# Patient Record
Sex: MALE | Race: WHITE | NOT HISPANIC OR LATINO | Employment: OTHER | URBAN - METROPOLITAN AREA
[De-identification: names, ages, dates, MRNs, and addresses within clinical notes are randomized per-mention and may not be internally consistent; named-entity substitution may affect disease eponyms.]

---

## 2017-11-08 ENCOUNTER — OFFICE VISIT (OUTPATIENT)
Dept: MEDICAL GROUP | Facility: MEDICAL CENTER | Age: 47
End: 2017-11-08
Payer: COMMERCIAL

## 2017-11-08 VITALS
RESPIRATION RATE: 16 BRPM | SYSTOLIC BLOOD PRESSURE: 140 MMHG | WEIGHT: 267 LBS | DIASTOLIC BLOOD PRESSURE: 72 MMHG | TEMPERATURE: 98 F | OXYGEN SATURATION: 96 % | HEIGHT: 74 IN | BODY MASS INDEX: 34.27 KG/M2 | HEART RATE: 67 BPM

## 2017-11-08 DIAGNOSIS — N43.3 HYDROCELE, UNSPECIFIED HYDROCELE TYPE: ICD-10-CM

## 2017-11-08 DIAGNOSIS — Z00.00 ROUTINE GENERAL MEDICAL EXAMINATION AT A HEALTH CARE FACILITY: ICD-10-CM

## 2017-11-08 DIAGNOSIS — E66.9 OBESITY (BMI 30-39.9): ICD-10-CM

## 2017-11-08 PROCEDURE — 99386 PREV VISIT NEW AGE 40-64: CPT | Performed by: NURSE PRACTITIONER

## 2017-11-08 ASSESSMENT — PATIENT HEALTH QUESTIONNAIRE - PHQ9: CLINICAL INTERPRETATION OF PHQ2 SCORE: 0

## 2017-11-09 NOTE — PROGRESS NOTES
"Subjective:      Oskar Bean is a 47 y.o. male who presents with New Patient            HPI Oskar Bean Is here today to establish care and for lab work.    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 He has not had any medical care in a few years but now has insurance. He states his health is generally well and the only chronic problem he has is a large hydrocele of his testicles. It is worse on the right side and there is noticeable swelling in his pants. He states they are not tender and this was worked up when he was with [facility] and was told it was not a hernia and that he could have surgery if he wished but it wasn't very important to do so. He states he still does not give him any discomfort except the fact that there is swelling in the area. He reports his health is generally well but he has not done lab work in a while. Review of his history shows he does not smoke or use drugs but he does drink at least 20 alcoholic beverages per week.        Social History   Substance Use Topics   • Smoking status: Former Smoker     Packs/day: 0.25     Years: 2.00     Types: Cigarettes     Quit date: 11/8/2002   • Smokeless tobacco: Never Used   • Alcohol use Yes      Comment: 20 a week     No current outpatient prescriptions on file.     No current facility-administered medications for this visit.    History reviewed. No pertinent past medical history.   Family History   Problem Relation Age of Onset   • Diabetes Mother    • No Known Problems Father        Review of Systems   All other systems reviewed and are negative.         Objective:     /72   Pulse 67   Temp 36.7 °C (98 °F)   Resp 16   Ht 1.88 m (6' 2\")   Wt 121.1 kg (267 lb)   SpO2 96%   BMI 34.28 kg/m²      Physical Exam   Constitutional: He is oriented to person, place, and time. He appears well-developed and well-nourished. No distress.   HENT:   Head: Normocephalic " and atraumatic.   Right Ear: External ear normal.   Left Ear: External ear normal.   Nose: Nose normal.   Mouth/Throat: Oropharynx is clear and moist.   Eyes: Conjunctivae are normal. Right eye exhibits no discharge. Left eye exhibits no discharge.   Neck: Normal range of motion. Neck supple. No tracheal deviation present. No thyromegaly present.   Cardiovascular: Normal rate, regular rhythm and normal heart sounds.    No murmur heard.  Pulmonary/Chest: Effort normal and breath sounds normal. No respiratory distress. He has no wheezes. He has no rales.   Abdominal: Soft. Bowel sounds are normal. He exhibits no distension and no mass. There is no tenderness. There is no rebound and no guarding. No hernia.   Genitourinary:   Genitourinary Comments: Swelling of the right testicle.   Lymphadenopathy:     He has no cervical adenopathy.   Neurological: He is alert and oriented to person, place, and time. Coordination normal.   Skin: Skin is warm and dry. No rash noted. He is not diaphoretic. No erythema.   Psychiatric: He has a normal mood and affect. His behavior is normal. Judgment and thought content normal.   Nursing note and vitals reviewed.              Assessment/Plan:     1. Routine general medical examination at a health care facility  Patient to do lab work and I will have him follow back if anything is abnormal but otherwise he will follow back in a year.  - COMP METABOLIC PANEL; Future  - LIPID PROFILE; Future  - TSH; Future    2. Hydrocele, unspecified hydrocele type  I discussed with patient getting a ultrasound of the area since he has been a few years as well as a referral to urology but he states he has high co-pays so would prefer not to do this unless his symptoms worsen.    3. Obesity (BMI 30-39.9)    - Patient identified as having weight management issue.  Appropriate orders and counseling given.

## 2017-11-27 ENCOUNTER — OFFICE VISIT (OUTPATIENT)
Dept: MEDICAL GROUP | Facility: MEDICAL CENTER | Age: 47
End: 2017-11-27
Payer: COMMERCIAL

## 2017-11-27 VITALS
OXYGEN SATURATION: 97 % | SYSTOLIC BLOOD PRESSURE: 132 MMHG | HEART RATE: 60 BPM | WEIGHT: 273 LBS | DIASTOLIC BLOOD PRESSURE: 70 MMHG | HEIGHT: 74 IN | RESPIRATION RATE: 16 BRPM | TEMPERATURE: 97.2 F | BODY MASS INDEX: 35.04 KG/M2

## 2017-11-27 DIAGNOSIS — N43.3 HYDROCELE, UNSPECIFIED HYDROCELE TYPE: ICD-10-CM

## 2017-11-27 DIAGNOSIS — E78.5 DYSLIPIDEMIA: ICD-10-CM

## 2017-11-27 DIAGNOSIS — R73.01 IMPAIRED FASTING GLUCOSE: ICD-10-CM

## 2017-11-27 DIAGNOSIS — N50.89 ENLARGED TESTICLE: ICD-10-CM

## 2017-11-27 LAB
HBA1C MFR BLD: 5 % (ref ?–5.8)
INT CON NEG: NORMAL
INT CON POS: NORMAL

## 2017-11-27 PROCEDURE — 83036 HEMOGLOBIN GLYCOSYLATED A1C: CPT | Performed by: NURSE PRACTITIONER

## 2017-11-27 PROCEDURE — 99213 OFFICE O/P EST LOW 20 MIN: CPT | Performed by: NURSE PRACTITIONER

## 2017-11-28 NOTE — PROGRESS NOTES
"Subjective:      Oskar Bean is a 47 y.o. male who presents with Results (lab results ) and Referral Needed (urology )            HPI Oskar Bean Is here today to discuss recent lab results as well as requesting referral to urology.      1. Dyslipidemia  Patient does not remember his last lipid panel results but his current results come back with an LDL of 141, total cholesterol 230 and triglycerides of 325. His LDL was slightly low at 39.    2. Hydrocele, unspecified hydrocele type  Patient reported on his initial visit that he was [place]rology [place] of state and had decided to wait on surgical intervention but now would like to talk to a local urologist about this.    3. Enlarged testicle  Patient has a very large left testicle with some swelling of the right testicle as well and this has been a issue for a while. He declined surgery when he was with [facility].    4. Impaired fasting glucose  Most recent fasting blood sugar comes back mildly elevated at 123. Patient denies history of diabetes and has no diabetic symptoms.  No current outpatient prescriptions on file.     No current facility-administered medications for this visit.      Social History   Substance Use Topics   • Smoking status: Former Smoker     Packs/day: 0.25     Years: 2.00     Types: Cigarettes     Quit date: 11/8/2002   • Smokeless tobacco: Never Used   • Alcohol use Yes      Comment: 20 a week     Family History   Problem Relation Age of Onset   • Diabetes Mother    • No Known Problems Father    History reviewed. No pertinent past medical history.    Review of Systems   All other systems reviewed and are negative.         Objective:     /70   Pulse 60   Temp 36.2 °C (97.2 °F)   Resp 16   Ht 1.88 m (6' 2\")   Wt 123.8 kg (273 lb)   SpO2 97%   BMI 35.05 kg/m²      Physical Exam   Constitutional: He is oriented to person, place, and time. He appears well-developed and well-nourished. No distress.   HENT:   Head: " Normocephalic and atraumatic.   Right Ear: External ear normal.   Left Ear: External ear normal.   Nose: Nose normal.   Mouth/Throat: Oropharynx is clear and moist.   Eyes: Conjunctivae are normal. Right eye exhibits no discharge. Left eye exhibits no discharge.   Neck: Normal range of motion. Neck supple. No tracheal deviation present. No thyromegaly present.   Cardiovascular: Normal rate, regular rhythm and normal heart sounds.    No murmur heard.  Pulmonary/Chest: Effort normal and breath sounds normal. No respiratory distress. He has no wheezes. He has no rales.   Genitourinary:   Genitourinary Comments: Enlarged testicles.   Lymphadenopathy:     He has no cervical adenopathy.   Neurological: He is alert and oriented to person, place, and time. Coordination normal.   Skin: Skin is warm and dry. No rash noted. He is not diaphoretic. No erythema.   Psychiatric: He has a normal mood and affect. His behavior is normal. Judgment and thought content normal.   Nursing note and vitals reviewed.              Assessment/Plan:     1. Dyslipidemia  I reviewed options with patient and since he has no history of heart disease or diabetes, he would like to try changing his diet and exercising more over the next 6 months and then will do repeat lab work.  - LIPID PROFILE; Future  - COMP METABOLIC PANEL; Future    2. Hydrocele, unspecified hydrocele type    - REFERRAL TO UROLOGY    3. Enlarged testicle  Patient would like to just discuss possible intervention with local urology.  - REFERRAL TO UROLOGY    4. Impaired fasting glucose  Although blood sugar was elevated at 123, his hemoglobin A1c comes back normal at 5.0.  - POCT  A1C

## 2018-01-03 DIAGNOSIS — N43.3 HYDROCELE, UNSPECIFIED HYDROCELE TYPE: ICD-10-CM

## 2018-01-08 ENCOUNTER — HOSPITAL ENCOUNTER (OUTPATIENT)
Dept: RADIOLOGY | Facility: MEDICAL CENTER | Age: 48
End: 2018-01-08
Attending: NURSE PRACTITIONER
Payer: COMMERCIAL

## 2018-01-08 DIAGNOSIS — N43.3 HYDROCELE, UNSPECIFIED HYDROCELE TYPE: ICD-10-CM

## 2018-01-08 PROCEDURE — 76870 US EXAM SCROTUM: CPT

## 2018-01-17 DIAGNOSIS — Z01.812 PRE-OPERATIVE LABORATORY EXAMINATION: ICD-10-CM

## 2018-01-17 LAB
ALBUMIN SERPL BCP-MCNC: 4.1 G/DL (ref 3.2–4.9)
ALBUMIN/GLOB SERPL: 1.5 G/DL
ALP SERPL-CCNC: 57 U/L (ref 30–99)
ALT SERPL-CCNC: 22 U/L (ref 2–50)
ANION GAP SERPL CALC-SCNC: 8 MMOL/L (ref 0–11.9)
APPEARANCE UR: CLEAR
AST SERPL-CCNC: 18 U/L (ref 12–45)
BILIRUB SERPL-MCNC: 0.8 MG/DL (ref 0.1–1.5)
BILIRUB UR QL STRIP.AUTO: NEGATIVE
BUN SERPL-MCNC: 21 MG/DL (ref 8–22)
CALCIUM SERPL-MCNC: 9.2 MG/DL (ref 8.5–10.5)
CHLORIDE SERPL-SCNC: 102 MMOL/L (ref 96–112)
CO2 SERPL-SCNC: 28 MMOL/L (ref 20–33)
COLOR UR: YELLOW
CREAT SERPL-MCNC: 1.02 MG/DL (ref 0.5–1.4)
GLOBULIN SER CALC-MCNC: 2.7 G/DL (ref 1.9–3.5)
GLUCOSE SERPL-MCNC: 104 MG/DL (ref 65–99)
GLUCOSE UR STRIP.AUTO-MCNC: NEGATIVE MG/DL
KETONES UR STRIP.AUTO-MCNC: NEGATIVE MG/DL
LEUKOCYTE ESTERASE UR QL STRIP.AUTO: NEGATIVE
MICRO URNS: NORMAL
NITRITE UR QL STRIP.AUTO: NEGATIVE
PH UR STRIP.AUTO: 5.5 [PH]
POTASSIUM SERPL-SCNC: 3.9 MMOL/L (ref 3.6–5.5)
PROT SERPL-MCNC: 6.8 G/DL (ref 6–8.2)
PROT UR QL STRIP: NEGATIVE MG/DL
RBC UR QL AUTO: NEGATIVE
SODIUM SERPL-SCNC: 138 MMOL/L (ref 135–145)
SP GR UR STRIP.AUTO: 1.02
UROBILINOGEN UR STRIP.AUTO-MCNC: 0.2 MG/DL

## 2018-01-17 PROCEDURE — 36415 COLL VENOUS BLD VENIPUNCTURE: CPT

## 2018-01-17 PROCEDURE — 80053 COMPREHEN METABOLIC PANEL: CPT

## 2018-01-17 PROCEDURE — 81003 URINALYSIS AUTO W/O SCOPE: CPT

## 2018-01-17 PROCEDURE — 87086 URINE CULTURE/COLONY COUNT: CPT

## 2018-01-19 LAB
BACTERIA UR CULT: NORMAL
SIGNIFICANT IND 70042: NORMAL
SITE SITE: NORMAL
SOURCE SOURCE: NORMAL

## 2018-01-24 ENCOUNTER — HOSPITAL ENCOUNTER (OUTPATIENT)
Facility: MEDICAL CENTER | Age: 48
End: 2018-01-24
Attending: UROLOGY | Admitting: UROLOGY
Payer: COMMERCIAL

## 2018-01-24 VITALS
TEMPERATURE: 97.7 F | SYSTOLIC BLOOD PRESSURE: 148 MMHG | DIASTOLIC BLOOD PRESSURE: 70 MMHG | WEIGHT: 261.25 LBS | BODY MASS INDEX: 32.48 KG/M2 | RESPIRATION RATE: 20 BRPM | HEIGHT: 75 IN | OXYGEN SATURATION: 96 %

## 2018-01-24 DIAGNOSIS — N43.2 OTHER HYDROCELE: Primary | ICD-10-CM

## 2018-01-24 LAB — PATHOLOGY CONSULT NOTE: NORMAL

## 2018-01-24 PROCEDURE — 160048 HCHG OR STATISTICAL LEVEL 1-5: Performed by: UROLOGY

## 2018-01-24 PROCEDURE — 160047 HCHG PACU  - EA ADDL 30 MINS PHASE II: Performed by: UROLOGY

## 2018-01-24 PROCEDURE — 160025 RECOVERY II MINUTES (STATS): Performed by: UROLOGY

## 2018-01-24 PROCEDURE — 700111 HCHG RX REV CODE 636 W/ 250 OVERRIDE (IP)

## 2018-01-24 PROCEDURE — A6403 STERILE GAUZE>16 <= 48 SQ IN: HCPCS | Performed by: UROLOGY

## 2018-01-24 PROCEDURE — 500383 HCHG DRAIN, PENROSE 3/8X12: Performed by: UROLOGY

## 2018-01-24 PROCEDURE — 500700 HCHG HEMOCLIP, SMALL (RED): Performed by: UROLOGY

## 2018-01-24 PROCEDURE — 160035 HCHG PACU - 1ST 60 MINS PHASE I: Performed by: UROLOGY

## 2018-01-24 PROCEDURE — 160028 HCHG SURGERY MINUTES - 1ST 30 MINS LEVEL 3: Performed by: UROLOGY

## 2018-01-24 PROCEDURE — 160002 HCHG RECOVERY MINUTES (STAT): Performed by: UROLOGY

## 2018-01-24 PROCEDURE — 500445 HCHG HEMOSTAT, SURGICEL 4X8: Performed by: UROLOGY

## 2018-01-24 PROCEDURE — 160039 HCHG SURGERY MINUTES - EA ADDL 1 MIN LEVEL 3: Performed by: UROLOGY

## 2018-01-24 PROCEDURE — 700105 HCHG RX REV CODE 258: Performed by: UROLOGY

## 2018-01-24 PROCEDURE — 500002 HCHG ADHESIVE, DERMABOND: Performed by: UROLOGY

## 2018-01-24 PROCEDURE — 88302 TISSUE EXAM BY PATHOLOGIST: CPT

## 2018-01-24 PROCEDURE — 160046 HCHG PACU - 1ST 60 MINS PHASE II: Performed by: UROLOGY

## 2018-01-24 PROCEDURE — 160009 HCHG ANES TIME/MIN: Performed by: UROLOGY

## 2018-01-24 PROCEDURE — 501838 HCHG SUTURE GENERAL: Performed by: UROLOGY

## 2018-01-24 PROCEDURE — 700101 HCHG RX REV CODE 250

## 2018-01-24 RX ORDER — KETOROLAC TROMETHAMINE 30 MG/ML
INJECTION, SOLUTION INTRAMUSCULAR; INTRAVENOUS
Status: COMPLETED
Start: 2018-01-24 | End: 2018-01-24

## 2018-01-24 RX ORDER — SODIUM CHLORIDE, SODIUM LACTATE, POTASSIUM CHLORIDE, CALCIUM CHLORIDE 600; 310; 30; 20 MG/100ML; MG/100ML; MG/100ML; MG/100ML
INJECTION, SOLUTION INTRAVENOUS
Status: DISCONTINUED | OUTPATIENT
Start: 2018-01-24 | End: 2018-01-24 | Stop reason: HOSPADM

## 2018-01-24 RX ORDER — POLYETHYLENE GLYCOL 3350 17 G/17G
17 POWDER, FOR SOLUTION ORAL DAILY
Qty: 30 EACH | Refills: 0 | Status: SHIPPED | OUTPATIENT
Start: 2018-01-24 | End: 2020-11-16

## 2018-01-24 RX ORDER — DOCUSATE SODIUM 100 MG/1
100 CAPSULE, LIQUID FILLED ORAL 2 TIMES DAILY
Qty: 60 CAP | Refills: 0 | Status: SHIPPED | OUTPATIENT
Start: 2018-01-24 | End: 2018-12-03

## 2018-01-24 RX ORDER — LABETALOL HYDROCHLORIDE 5 MG/ML
INJECTION, SOLUTION INTRAVENOUS
Status: COMPLETED
Start: 2018-01-24 | End: 2018-01-24

## 2018-01-24 RX ORDER — BUPIVACAINE HYDROCHLORIDE 2.5 MG/ML
INJECTION, SOLUTION EPIDURAL; INFILTRATION; INTRACAUDAL
Status: DISCONTINUED | OUTPATIENT
Start: 2018-01-24 | End: 2018-01-24 | Stop reason: HOSPADM

## 2018-01-24 RX ORDER — OXYCODONE HYDROCHLORIDE 5 MG/1
5-10 TABLET ORAL EVERY 4 HOURS PRN
Qty: 15 TAB | Refills: 0 | Status: SHIPPED | OUTPATIENT
Start: 2018-01-24 | End: 2018-01-29

## 2018-01-24 RX ADMIN — LABETALOL HYDROCHLORIDE 10 MG: 5 INJECTION, SOLUTION INTRAVENOUS at 16:44

## 2018-01-24 RX ADMIN — SODIUM CHLORIDE, POTASSIUM CHLORIDE, SODIUM LACTATE AND CALCIUM CHLORIDE: 600; 310; 30; 20 INJECTION, SOLUTION INTRAVENOUS at 13:30

## 2018-01-24 RX ADMIN — KETOROLAC TROMETHAMINE 30 MG: 30 INJECTION, SOLUTION INTRAMUSCULAR at 17:43

## 2018-01-24 ASSESSMENT — ENCOUNTER SYMPTOMS
RESPIRATORY NEGATIVE: 1
EYES NEGATIVE: 1
CONSTITUTIONAL NEGATIVE: 1
MUSCULOSKELETAL NEGATIVE: 1
NEUROLOGICAL NEGATIVE: 1
CARDIOVASCULAR NEGATIVE: 1
PSYCHIATRIC NEGATIVE: 1
GASTROINTESTINAL NEGATIVE: 1

## 2018-01-24 ASSESSMENT — PAIN SCALES - GENERAL
PAINLEVEL_OUTOF10: 0

## 2018-01-24 NOTE — OR NURSING
1245: Brought patient back to pre-op and April, RN assumed care.  1305: Rcvd report from April, ANTONIA and assumed care. Pt sitting on side of the bed, very nervous, tried to assure pt that all was going well.  1344: Patient allergies and NPO status verified, home medication reconciliation completed and belongings secured. Patient verbalizes understanding of pain scale, expected course of stay and plan of care. Surgical site verified with patient. IV access established. Sequentials placed on legs.

## 2018-01-24 NOTE — H&P
[facility] Consult/H&P Note    Primary Service: Urology  Attending: Kaz Dey M.D.  Patient's Name/MRN: Oskar Bean, [id number]    Admit Date:1/24/2018  Today's Date: 1/24/2018   Length of stay:  LOS: 0 days   Room #: SMPREPOOL/NONE      Reason for consult/chief complaint: bilaterl hydroceles.   ID/HPI: Oskar Bean is a 47 y.o. male patient with 6 year history of enalrgin L hydrocele with bilaterl hydroceles.  They are both bothersome and he has massive hydrocele on L in addition to a moderate one on the right.  He strongly desires bilaterl hydrocelectomy.   He ha trouble sitting, has frequent pain, and difficulty with sex because of it.  He has no other new medical problems since we last chatted.      Past Medical History:   History reviewed. No pertinent past medical history.     Past Surgical History:   Past Surgical History:   Procedure Laterality Date   • ANKLE ORIF Left       Family History:   Family History   Problem Relation Age of Onset   • Diabetes Mother    • No Known Problems Father        Social History:   Social History   Substance Use Topics   • Smoking status: Former Smoker     Packs/day: 0.25     Years: 2.00     Types: Cigarettes     Quit date: 11/8/2002   • Smokeless tobacco: Never Used   • Alcohol use 8.4 oz/week     14 Standard drinks or equivalent per week      Social History     Social History Narrative   • No narrative on file        Allergies: he Patient has no known allergies.    Medications:   No prescriptions prior to admission.         Review of Systems  Review of Systems   Constitutional: Negative.    HENT: Negative.    Eyes: Negative.    Respiratory: Negative.    Cardiovascular: Negative.    Gastrointestinal: Negative.    Genitourinary:        Massive scortal swelling.   Musculoskeletal: Negative.    Skin: Negative.    Neurological: Negative.    Endo/Heme/Allergies: Negative.    Psychiatric/Behavioral: Negative.    All other systems reviewed and are negative.    "    Physical Exam  VITAL SIGNS: BP (!) 189/95   Temp 36.6 °C (97.9 °F)   Resp 16   Ht 1.905 m (6' 3\")   Wt 118.5 kg (261 lb 3.9 oz)   SpO2 94%   BMI 32.65 kg/m²   GENERAL:  alert, in no acute distress  EYES: EOMI and normal accomodation  BACK: No CVA tenderness.   CHEST AND LUNGS: CTAB without W/R/R.   CARDIOVASCULAR: RRR without M/R/G's.  ABDOMEN: Abdomen soft, nontender. No masses, organomegaly.  : Massive L hydrocele with inability to palpate testis. This goes back all the way towards external ring.  Moderate R sided hydrocele, soft, testicule is palpable.   EXTREMITIES: Warm and well perfused without c/c/e  NEURO: No focal deficits  SKIN: Skin color, texture, turgor normal. No rashes, lesions, nor jaundice.      Labs:  CBC:   No results found for: WBC, HEMOGLOBIN, HEMATOCRIT    Glucose:  Lab Results   Component Value Date/Time    GLUCOSE 104 (H) 01/17/2018 10:40 AM    Electrolytes:  Lab Results   Component Value Date/Time    SODIUM 138 01/17/2018 10:40 AM    POTASSIUM 3.9 01/17/2018 10:40 AM    CHLORIDE 102 01/17/2018 10:40 AM    CO2 28 01/17/2018 10:40 AM    GLUCOSE 104 (H) 01/17/2018 10:40 AM    BUN 21 01/17/2018 10:40 AM    CREATININE 1.02 01/17/2018 10:40 AM    CALCIUM 9.2 01/17/2018 10:40 AM         Urinalysis:   Lab Results   Component Value Date/Time    COLORURINE Yellow 01/17/2018 10:40 AM    CLARITY Clear 01/17/2018 10:40 AM    SPECGRAVITY 1.023 01/17/2018 10:40 AM    PHURINE 5.5 01/17/2018 10:40 AM    GLUCOSEUR Negative 01/17/2018 10:40 AM    KETONES Negative 01/17/2018 10:40 AM    NITRITE Negative 01/17/2018 10:40 AM    OCCULTBLOOD Negative 01/17/2018 10:40 AM       Imaging:  US 2017 No testicular masses identified.  Single calcification within the right testis nonspecific.  2.7 cm right epididymal cysts.  Massive left hydrocele. Moderate/large Right hydrocele.     Assessment/Recommendation   47 y.o.M with bilatearl hydroceles L >R who desires bilateral hydrocelectomy.  After a full discussion " of the alternatives risks and benefits of the procedure, the patient consented to proceeding with the planned procedure.  Risks discussed, but not limited to, included infection, sepsis, bleeding, hematoma requiring drainage, recurrent hydrocele, skin incision opening, need for secondary procedures, testicular atrophy, testicular or scrotal pain on this side, post op swelling, and the cardiovascular and pulmonary complications of anesthesia/surgery including DVT, Pe, and death.  I did  that we could wait on R hydrocele as it is not tense or as bothersome, however, he worries that like the L it will enlarge and wishes it done at the same time.     · Consent for bilateral hydrocelectomy done  · 2g ancef  · DC home after procedure.        Kaz Dey MD  [address] [facility] [id number]  [place] [id number] [phone]

## 2018-01-24 NOTE — OP REPORT
"[facility] Operative Report    Pre-operative Diagnosis: 1. Bilateral left greater than right Hydrocele, Symptomatic   Post-operative Diagnosis: 2. Same    Procedure: 1. Left hyrdocelectomy  2. Right hydrocelectomy    Attending: Kaz Dey MD   Assistant: None   Anesthesia: LMA, General, Mary   Estimated Blood Loss: minimal   IV fluids <1L crystalloid   Specimens: 1. L Hydrocele Sac  2. R Hydrocele Sac   Drains: 1. 3/8\" penrose via Left hemiscrotum   Complications: None   Wound class I. Clean   Condition: Stable, procedure well tolerated    Disposition:  PACU, home after recovery.   Findings: 1. RTC 4 weeks for f/u appointment  2. f/u 2-3 days for drain removal     Indications for Procedure:  This is a 47 y.o. male with history of bilaterl hydrocele for sometime which has grown in size and is adequately bothersome on regular basis and desires therapy for this.  Alternatives including continued observation, IR drainage were discussed.  Risks discussed, but not limited to, included infection, sepsis, bleeding, hematoma requiring drainage, recurrent hydrocele, skin incision opening, need for secondary procedures, testicular atrophy, testicular or scrotal pain on this side, post op swelling, and the cardiovascular and pulmonary complications of anesthesia/surgery including DVT, Pe, and death. After a full discussion of the alternatives risks and benefits of the procedure, the patient consented to proceeding with the planned procedure.     Procedure in Detail:  The patient was positioned in supine position and perineum and genitals were shaved, prepped and draped in the standard sterile fashion.  We made a 5 cm incision along the median raphae of the scrotum through the dartos. While elevating the large left hydrocele through this incision the fascial layers were taken down layer by layer to the tunica vaginalis.  We did deliver the massive hydrocele and testicle within outside of the scrotum.  Separate " layers of tunica were taken down individually and then the tunical sac was opened and the hydrocele fluid was drained and testicle was delivered. This did drain approximately 1500 mL of fluid. The excess tunica vaginalis was excised and sent for specimen. We used electrocautery to provide initial hemostasis along the edges of excised tunica vaginalis.     Next, we inverted the incised edges of tunica vaginalis and wrapped them around testicle posteriorly and partially approximated them using a running 3-0 vicryl suture to provide hemostasis and prevent recurrent formation of hydrocele. I did additionally run a 3-0 Vicryl whipstitch along the excess edge of the tunica vaginalis was not inverted on that side.  I then took the time everting the dartos fascia and into the vaginalis cauterizing any bleeders I could find. I did pack this left hemiscrotum with 2 Ray-Srinivas's and proceeded to the right side.    Similarly the right hydrocele and testicle was delivered through the dartos and tunica vaginalis.  This did drain approximately 50 mL of fluid and any excess tunica vaginalis was excised avoiding any of the important testicular structures. A 3-0 Vicryl suture was then ran around as a whip stitch along the cut orders of the tunica vaginalis for hemostatic purposes.    We then inverted the dartos layer through the incision and assured meticulous hemostasis using electrocautery.  20cc of 0.25% marcaine were injected generaously into cord as a spermatic cord block in addition bilatearlly and  some along incsion edges. The wound was irrigated with copious irrigation at this point. Dartos was closed using a 2-0 running absorbable suture.  The skin was closed with a running 4-0 chromic gut suture in addition to a three interrupted suture along the closed incision.  This was covered with skin glue, fluffs, and scrotal support.      The patient was extubated easily, tolerated the procedure well, and was taken to the PACU in  stable condition. The attending physician, Kaz Dey M.D. was scrubbed and participated in the entirety of the case.          Kaz Dey MD  [address] [place] [address] [address]  [place] [address] [phone]

## 2018-01-25 NOTE — OR NURSING
1614 Pt to PACU from OR via gurney, respirations spontaneous and non-labored via 1L NC. Groin surgical sites clean, dry and intact, pt awake with no complaints of pain or nausea.  1625 No changes, VSS.   1640 Pt hypertensive Dr. Varela updated on pt status orders received for one dose of 20mg labetolol to be given then ok to D/C home.     1644 10mg administered, pt immediately bradycardic. 2nd 10mg held.  1647 Pt SR in low 60s.  1655 Pt still denies pain and nausea, VSS.  1705 Pt reports back discomfort declines analgesia.    1710 Pt meets criteria for stage II status. Pt stage II status with same RN in PACU I.   1725 Pt dressed and transferred to recliner.   1733 Pt wife at bedside, pt reports pain in low back increasing would like analgesia.    1739 Dr. Varela and Dr. Dey updated on pt status orders received and implemented.   1743 IV toradol given per MD order.   1745 Provided a copy and educated pt and family on discharge instructions.  1803 Pt ambulated to void reports pain has subsided.   Pt unable to void.  1809 Pt up to restroom again.    1815 Pt voids successfully.   1818 Pt D/C home via wheelchair.

## 2018-01-25 NOTE — DISCHARGE INSTRUCTIONS
ACTIVITY: Rest and take it easy for the first 24 hours.  A responsible adult is recommended to remain with you during that time.  It is normal to feel sleepy.  We encourage you to not do anything that requires balance, judgment or coordination.    MILD FLU-LIKE SYMPTOMS ARE NORMAL. YOU MAY EXPERIENCE GENERALIZED MUSCLE ACHES, THROAT IRRITATION, HEADACHE AND/OR SOME NAUSEA.    FOR 24 HOURS DO NOT:  Drive, operate machinery or run household appliances.  Drink beer or alcoholic beverages.   Make important decisions or sign legal documents.    DIET: To avoid nausea, slowly advance diet as tolerated, avoiding spicy or greasy foods for the first day.  Add more substantial food to your diet according to your physician's instructions.  INCREASE FLUIDS AND FIBER TO AVOID CONSTIPATION.    SURGICAL DRESSING/BATHING: Wear jockstrap at all times for the next two weeks. Change fluff as needed. Ok to shower tomorrow but no submerging in water (no baths, no hot tubs, no swimming pools)     FOLLOW-UP APPOINTMENT:  A follow-up appointment should be arranged with your doctor Friday for drain removal; call to schedule.    You should CALL YOUR PHYSICIAN if you develop:  Fever greater than 101 degrees F.  Pain not relieved by medication, or persistent nausea or vomiting.  Excessive bleeding (blood soaking through dressing) or unexpected drainage from the wound.  Extreme redness or swelling around the incision site, drainage of pus or foul smelling drainage.  Inability to urinate or empty your bladder within 8 hours - return to ER.  Problems with breathing or chest pain - call 911.    You should call 911 if you develop problems with breathing or chest pain.  If you are unable to contact your doctor or surgical center, you should go to the nearest emergency room or urgent care center. Dr. Dey' telephone #: [phone].     If any questions arise, call your doctor.  If your doctor is not available, please feel free to call the  Surgical Center at [phone].  The Center is open Monday through Friday from 7AM to 7PM.  You can also call the HEALTH HOTLINE open 24 hours/day, 7 days/week and speak to a nurse at [phone], or toll free at [phone].    A registered nurse may call you a few days after your surgery to see how you are doing after your procedure.    MEDICATIONS: Resume taking daily medication.  Take prescribed pain medication with food.  If no medication is prescribed, you may take non-aspirin pain medication if needed.  PAIN MEDICATION CAN BE VERY CONSTIPATING.  Take a stool softener or laxative such as senokot, pericolace, or milk of magnesia if needed.    Prescription given for Roxicodone.  Last pain medication given at none given in recovery.    If your physician has prescribed pain medication that includes Acetaminophen (Tylenol), do not take additional Acetaminophen (Tylenol) while taking the prescribed medication.    Depression / Suicide Risk    As you are discharged from this [facility] facility, it is important to learn how to keep safe from harming yourself.    Recognize the warning signs:  · Abrupt changes in personality, positive or negative- including increase in energy   · Giving away possessions  · Change in eating patterns- significant weight changes-  positive or negative  · Change in sleeping patterns- unable to sleep or sleeping all the time   · Unwillingness or inability to communicate  · Depression  · Unusual sadness, discouragement and loneliness  · Talk of wanting to die  · Neglect of personal appearance   · Rebelliousness- reckless behavior  · Withdrawal from people/activities they love  · Confusion- inability to concentrate     If you or a loved one observes any of these behaviors or has concerns about self-harm, here's what you can do:  · Talk about it- your feelings and reasons for harming yourself  · Remove any means that you might use to hurt yourself (examples: pills, rope, extension  cords, firearm)  · Get professional help from the community (Mental Health, Substance Abuse, psychological counseling)  · Do not be alone:Call your Safe Contact- someone whom you trust who will be there for you.  · Call your local CRISIS HOTLINE 861-4129 or 807-617-1798  · Call your local Children's Mobile Crisis Response Team [place] (429) 172-3854 or www.[employer]  · Call the toll free National Suicide Prevention Hotlines   · National Suicide Prevention Lifeline 633-762-OSCS (0678)  · National Hope Line Network 800-SUICIDE (634-6667)

## 2018-01-31 DIAGNOSIS — M25.571 CHRONIC PAIN OF RIGHT ANKLE: ICD-10-CM

## 2018-01-31 DIAGNOSIS — G89.29 CHRONIC PAIN OF RIGHT ANKLE: ICD-10-CM

## 2018-11-14 DIAGNOSIS — Z00.00 ROUTINE GENERAL MEDICAL EXAMINATION AT A HEALTH CARE FACILITY: ICD-10-CM

## 2018-11-27 ENCOUNTER — HOSPITAL ENCOUNTER (OUTPATIENT)
Dept: LAB | Facility: MEDICAL CENTER | Age: 48
End: 2018-11-27
Attending: NURSE PRACTITIONER
Payer: COMMERCIAL

## 2018-11-27 DIAGNOSIS — Z00.00 ROUTINE GENERAL MEDICAL EXAMINATION AT A HEALTH CARE FACILITY: ICD-10-CM

## 2018-11-27 PROCEDURE — 80061 LIPID PANEL: CPT

## 2018-11-27 PROCEDURE — 84443 ASSAY THYROID STIM HORMONE: CPT

## 2018-11-27 PROCEDURE — 36415 COLL VENOUS BLD VENIPUNCTURE: CPT

## 2018-11-27 PROCEDURE — 80053 COMPREHEN METABOLIC PANEL: CPT

## 2018-11-28 LAB
ALBUMIN SERPL BCP-MCNC: 4.4 G/DL (ref 3.2–4.9)
ALBUMIN/GLOB SERPL: 1.6 G/DL
ALP SERPL-CCNC: 58 U/L (ref 30–99)
ALT SERPL-CCNC: 19 U/L (ref 2–50)
ANION GAP SERPL CALC-SCNC: 7 MMOL/L (ref 0–11.9)
AST SERPL-CCNC: 16 U/L (ref 12–45)
BILIRUB SERPL-MCNC: 0.7 MG/DL (ref 0.1–1.5)
BUN SERPL-MCNC: 23 MG/DL (ref 8–22)
CALCIUM SERPL-MCNC: 9.2 MG/DL (ref 8.5–10.5)
CHLORIDE SERPL-SCNC: 103 MMOL/L (ref 96–112)
CHOLEST SERPL-MCNC: 230 MG/DL (ref 100–199)
CO2 SERPL-SCNC: 28 MMOL/L (ref 20–33)
CREAT SERPL-MCNC: 1.09 MG/DL (ref 0.5–1.4)
FASTING STATUS PATIENT QL REPORTED: NORMAL
GLOBULIN SER CALC-MCNC: 2.7 G/DL (ref 1.9–3.5)
GLUCOSE SERPL-MCNC: 119 MG/DL (ref 65–99)
HDLC SERPL-MCNC: 42 MG/DL
LDLC SERPL CALC-MCNC: 115 MG/DL
POTASSIUM SERPL-SCNC: 4.5 MMOL/L (ref 3.6–5.5)
PROT SERPL-MCNC: 7.1 G/DL (ref 6–8.2)
SODIUM SERPL-SCNC: 138 MMOL/L (ref 135–145)
TRIGL SERPL-MCNC: 364 MG/DL (ref 0–149)
TSH SERPL DL<=0.005 MIU/L-ACNC: 3.27 UIU/ML (ref 0.38–5.33)

## 2018-12-03 ENCOUNTER — OFFICE VISIT (OUTPATIENT)
Dept: MEDICAL GROUP | Facility: MEDICAL CENTER | Age: 48
End: 2018-12-03
Payer: COMMERCIAL

## 2018-12-03 VITALS
SYSTOLIC BLOOD PRESSURE: 138 MMHG | WEIGHT: 251 LBS | HEIGHT: 75 IN | OXYGEN SATURATION: 98 % | HEART RATE: 74 BPM | RESPIRATION RATE: 16 BRPM | BODY MASS INDEX: 31.21 KG/M2 | DIASTOLIC BLOOD PRESSURE: 76 MMHG

## 2018-12-03 DIAGNOSIS — Z23 NEED FOR TDAP VACCINATION: ICD-10-CM

## 2018-12-03 DIAGNOSIS — R73.01 IMPAIRED FASTING GLUCOSE: ICD-10-CM

## 2018-12-03 DIAGNOSIS — E78.5 DYSLIPIDEMIA: ICD-10-CM

## 2018-12-03 DIAGNOSIS — Z00.00 ROUTINE GENERAL MEDICAL EXAMINATION AT A HEALTH CARE FACILITY: ICD-10-CM

## 2018-12-03 DIAGNOSIS — Z23 INFLUENZA VACCINE NEEDED: ICD-10-CM

## 2018-12-03 PROBLEM — N43.3 HYDROCELE: Status: RESOLVED | Noted: 2017-11-08 | Resolved: 2018-12-03

## 2018-12-03 LAB
HBA1C MFR BLD: 5.2 % (ref ?–5.8)
INT CON NEG: NEGATIVE
INT CON POS: POSITIVE

## 2018-12-03 PROCEDURE — 90715 TDAP VACCINE 7 YRS/> IM: CPT | Performed by: NURSE PRACTITIONER

## 2018-12-03 PROCEDURE — 99396 PREV VISIT EST AGE 40-64: CPT | Mod: 25 | Performed by: NURSE PRACTITIONER

## 2018-12-03 PROCEDURE — 83036 HEMOGLOBIN GLYCOSYLATED A1C: CPT | Performed by: NURSE PRACTITIONER

## 2018-12-03 PROCEDURE — 90472 IMMUNIZATION ADMIN EACH ADD: CPT | Performed by: NURSE PRACTITIONER

## 2018-12-03 PROCEDURE — 90686 IIV4 VACC NO PRSV 0.5 ML IM: CPT | Performed by: NURSE PRACTITIONER

## 2018-12-03 PROCEDURE — 90471 IMMUNIZATION ADMIN: CPT | Performed by: NURSE PRACTITIONER

## 2018-12-03 ASSESSMENT — PATIENT HEALTH QUESTIONNAIRE - PHQ9: CLINICAL INTERPRETATION OF PHQ2 SCORE: 0

## 2018-12-04 NOTE — PROGRESS NOTES
Subjective:      Oskar Bean is a 48 y.o. male who presents with Annual Exam        CC: Patient here today accompanied by his wife for yearly physical.    HPI Oskar Bean        1. Routine general medical examination at a health care facility  Patient reports since last year he has feels no different and generally feels healthy.  He works flipping houses which requires manual labor and he reports no problems with this.  He is on no prescription medicines.    2. Impaired fasting glucose  Patient previously had mild elevation in blood sugar but hemoglobin A1c was normal.  He again shows a mild elevation in blood sugar but hemoglobin A1c today is at 5.2.    3. Dyslipidemia  Cholesterol levels, especially triglycerides are mildly elevated at 325.  He has no risk factors and is 10-year cardiac risk assessment score is at 4.8%.    4. Influenza vaccine needed  Patient due for this years vaccine.    5. Need for Tdap vaccination  Patient has not had vaccine in over 10 years  Current Outpatient Prescriptions   Medication Sig Dispense Refill   • polyethylene glycol/lytes (MIRALAX) Pack Take 1 Packet by mouth every day. Please take to prevent constipation following your procedure.  If having loose stools this can be held. 30 Each 0     No current facility-administered medications for this visit.      Social History   Substance Use Topics   • Smoking status: Former Smoker     Packs/day: 0.25     Years: 2.00     Types: Cigarettes     Quit date: 11/8/2002   • Smokeless tobacco: Never Used   • Alcohol use 8.4 oz/week     14 Standard drinks or equivalent per week     Family History   Problem Relation Age of Onset   • Diabetes Mother    • No Known Problems Father    History reviewed. No pertinent past medical history.    Review of Systems   All other systems reviewed and are negative.         Objective:     /76 (BP Location: Right arm, Patient Position: Sitting, BP Cuff Size: Adult)   Pulse 74   Resp 16   Ht  "1.905 m (6' 3\")   Wt 113.9 kg (251 lb)   SpO2 98%   BMI 31.37 kg/m²      Physical Exam   Constitutional: He is oriented to person, place, and time. He appears well-developed and well-nourished. No distress.   HENT:   Head: Normocephalic and atraumatic.   Right Ear: External ear normal.   Left Ear: External ear normal.   Nose: Nose normal.   Mouth/Throat: Oropharynx is clear and moist.   Eyes: Conjunctivae are normal. Right eye exhibits no discharge. Left eye exhibits no discharge.   Neck: Normal range of motion. Neck supple. No tracheal deviation present. No thyromegaly present.   Cardiovascular: Normal rate, regular rhythm and normal heart sounds.    No murmur heard.  Pulmonary/Chest: Effort normal and breath sounds normal. No respiratory distress. He has no wheezes. He has no rales.   Abdominal: Soft. Bowel sounds are normal. He exhibits no distension and no mass. There is no tenderness. There is no rebound and no guarding. No hernia.   Lymphadenopathy:     He has no cervical adenopathy.   Neurological: He is alert and oriented to person, place, and time. Coordination normal.   Skin: Skin is warm and dry. No rash noted. He is not diaphoretic. No erythema.   Psychiatric: He has a normal mood and affect. His behavior is normal. Judgment and thought content normal.   Nursing note and vitals reviewed.              Assessment/Plan:     1. Routine general medical examination at a health care facility  Physical exam appears good and I reviewed with him his test results which show low ASCVD risk but he does have elevated triglycerides.  I advised him on the need for a low-carb diet and to eat more fruits and vegetables as his carbohydrates.  - COMP METABOLIC PANEL; Future  - Lipid Profile; Future  - CBC WITH DIFFERENTIAL; Future  - VITAMIN D,25 HYDROXY; Future    2. Impaired fasting glucose  Hemoglobin A1c today is only at 5.2  - POCT  A1C    3. Dyslipidemia  Cholesterol levels elevated but ASCVD risk is at 4.8%.    4. " Influenza vaccine needed  I have placed the below orders and discussed them with an approved delegating provider. The MA is performing the below orders under the direction of Dr. Ohara    - Influenza Vaccine Quad Injection >3Y (PF)    5. Need for Tdap vaccination  I have placed the below orders and discussed them with an approved delegating provider. The MA is performing the below orders under the direction of Dr. Ohara    - Tdap =>6yo IM

## 2019-11-20 ENCOUNTER — HOSPITAL ENCOUNTER (OUTPATIENT)
Dept: LAB | Facility: MEDICAL CENTER | Age: 49
End: 2019-11-20
Attending: NURSE PRACTITIONER
Payer: COMMERCIAL

## 2019-11-20 DIAGNOSIS — Z00.00 ROUTINE GENERAL MEDICAL EXAMINATION AT A HEALTH CARE FACILITY: ICD-10-CM

## 2019-11-20 LAB
25(OH)D3 SERPL-MCNC: 10 NG/ML (ref 30–100)
ALBUMIN SERPL BCP-MCNC: 4.4 G/DL (ref 3.2–4.9)
ALBUMIN/GLOB SERPL: 1.8 G/DL
ALP SERPL-CCNC: 54 U/L (ref 30–99)
ALT SERPL-CCNC: 22 U/L (ref 2–50)
ANION GAP SERPL CALC-SCNC: 8 MMOL/L (ref 0–11.9)
AST SERPL-CCNC: 16 U/L (ref 12–45)
BASOPHILS # BLD AUTO: 0.5 % (ref 0–1.8)
BASOPHILS # BLD: 0.03 K/UL (ref 0–0.12)
BILIRUB SERPL-MCNC: 0.9 MG/DL (ref 0.1–1.5)
BUN SERPL-MCNC: 18 MG/DL (ref 8–22)
CALCIUM SERPL-MCNC: 9.2 MG/DL (ref 8.5–10.5)
CHLORIDE SERPL-SCNC: 102 MMOL/L (ref 96–112)
CHOLEST SERPL-MCNC: 234 MG/DL (ref 100–199)
CO2 SERPL-SCNC: 29 MMOL/L (ref 20–33)
CREAT SERPL-MCNC: 1.17 MG/DL (ref 0.5–1.4)
EOSINOPHIL # BLD AUTO: 0.52 K/UL (ref 0–0.51)
EOSINOPHIL NFR BLD: 8.4 % (ref 0–6.9)
ERYTHROCYTE [DISTWIDTH] IN BLOOD BY AUTOMATED COUNT: 40.3 FL (ref 35.9–50)
GLOBULIN SER CALC-MCNC: 2.5 G/DL (ref 1.9–3.5)
GLUCOSE SERPL-MCNC: 108 MG/DL (ref 65–99)
HCT VFR BLD AUTO: 49.8 % (ref 42–52)
HDLC SERPL-MCNC: 44 MG/DL
HGB BLD-MCNC: 16.9 G/DL (ref 14–18)
IMM GRANULOCYTES # BLD AUTO: 0.02 K/UL (ref 0–0.11)
IMM GRANULOCYTES NFR BLD AUTO: 0.3 % (ref 0–0.9)
LDLC SERPL CALC-MCNC: ABNORMAL MG/DL
LYMPHOCYTES # BLD AUTO: 1.82 K/UL (ref 1–4.8)
LYMPHOCYTES NFR BLD: 29.4 % (ref 22–41)
MCH RBC QN AUTO: 31.8 PG (ref 27–33)
MCHC RBC AUTO-ENTMCNC: 33.9 G/DL (ref 33.7–35.3)
MCV RBC AUTO: 93.6 FL (ref 81.4–97.8)
MONOCYTES # BLD AUTO: 0.51 K/UL (ref 0–0.85)
MONOCYTES NFR BLD AUTO: 8.2 % (ref 0–13.4)
NEUTROPHILS # BLD AUTO: 3.3 K/UL (ref 1.82–7.42)
NEUTROPHILS NFR BLD: 53.2 % (ref 44–72)
NRBC # BLD AUTO: 0 K/UL
NRBC BLD-RTO: 0 /100 WBC
PLATELET # BLD AUTO: 215 K/UL (ref 164–446)
PMV BLD AUTO: 11.2 FL (ref 9–12.9)
POTASSIUM SERPL-SCNC: 4.5 MMOL/L (ref 3.6–5.5)
PROT SERPL-MCNC: 6.9 G/DL (ref 6–8.2)
RBC # BLD AUTO: 5.32 M/UL (ref 4.7–6.1)
SODIUM SERPL-SCNC: 139 MMOL/L (ref 135–145)
TRIGL SERPL-MCNC: 478 MG/DL (ref 0–149)
WBC # BLD AUTO: 6.2 K/UL (ref 4.8–10.8)

## 2019-11-20 PROCEDURE — 85025 COMPLETE CBC W/AUTO DIFF WBC: CPT

## 2019-11-20 PROCEDURE — 80053 COMPREHEN METABOLIC PANEL: CPT

## 2019-11-20 PROCEDURE — 80061 LIPID PANEL: CPT

## 2019-11-20 PROCEDURE — 82306 VITAMIN D 25 HYDROXY: CPT

## 2019-11-20 PROCEDURE — 36415 COLL VENOUS BLD VENIPUNCTURE: CPT

## 2019-11-25 ENCOUNTER — OFFICE VISIT (OUTPATIENT)
Dept: MEDICAL GROUP | Facility: MEDICAL CENTER | Age: 49
End: 2019-11-25
Payer: COMMERCIAL

## 2019-11-25 VITALS
WEIGHT: 249 LBS | OXYGEN SATURATION: 98 % | HEART RATE: 67 BPM | SYSTOLIC BLOOD PRESSURE: 140 MMHG | BODY MASS INDEX: 30.96 KG/M2 | HEIGHT: 75 IN | DIASTOLIC BLOOD PRESSURE: 68 MMHG | TEMPERATURE: 98.3 F

## 2019-11-25 DIAGNOSIS — Z00.00 ROUTINE GENERAL MEDICAL EXAMINATION AT A HEALTH CARE FACILITY: ICD-10-CM

## 2019-11-25 DIAGNOSIS — Z23 NEED FOR INFLUENZA VACCINATION: ICD-10-CM

## 2019-11-25 DIAGNOSIS — R73.9 BLOOD GLUCOSE ELEVATED: ICD-10-CM

## 2019-11-25 DIAGNOSIS — E55.9 VITAMIN D DEFICIENCY: ICD-10-CM

## 2019-11-25 DIAGNOSIS — E66.9 OBESITY (BMI 30-39.9): ICD-10-CM

## 2019-11-25 LAB
HBA1C MFR BLD: 5.1 % (ref 0–5.6)
INT CON NEG: NORMAL
INT CON POS: NORMAL

## 2019-11-25 PROCEDURE — 90471 IMMUNIZATION ADMIN: CPT | Performed by: NURSE PRACTITIONER

## 2019-11-25 PROCEDURE — 83036 HEMOGLOBIN GLYCOSYLATED A1C: CPT | Performed by: NURSE PRACTITIONER

## 2019-11-25 PROCEDURE — 99396 PREV VISIT EST AGE 40-64: CPT | Mod: 25 | Performed by: NURSE PRACTITIONER

## 2019-11-25 PROCEDURE — 90686 IIV4 VACC NO PRSV 0.5 ML IM: CPT | Performed by: NURSE PRACTITIONER

## 2019-11-25 RX ORDER — ERGOCALCIFEROL 1.25 MG/1
50000 CAPSULE ORAL
Qty: 4 CAP | Refills: 3 | Status: SHIPPED | OUTPATIENT
Start: 2019-11-25 | End: 2020-11-16 | Stop reason: SDUPTHER

## 2019-11-25 ASSESSMENT — PATIENT HEALTH QUESTIONNAIRE - PHQ9: CLINICAL INTERPRETATION OF PHQ2 SCORE: 0

## 2019-11-26 NOTE — PROGRESS NOTES
Subjective:      Oskar Bean is a 49 y.o. male who presents with Follow-Up; Labs Only; and Immunizations (Flu Shot )        CC: Patient is here today accompanied by his wife for annual wellness visit and to review recent lab results.    HPI         1. 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  He is not exercising regularly but does not smoke.  He does admit to drinking about 2-3 alcoholic beverages per night    2. Blood glucose elevated  Patient has consistently showed mild elevations in blood sugars when he checks his fasting blood sugar but his hemoglobin A1c testing is always been good.  He admits it might be due to the high carbohydrate load he has at night.  His chemistry panel is otherwise normal as is his CBC.  His triglycerides are high.    3. Vitamin D deficiency  Patient is vitamin D is low at 10 and he is not currently on any vitamin D replacement    4. Need for influenza vaccination  Patient due for flu shot    5. Obesity (BMI 30-39.9)  Patient's weight is steady  History reviewed. No pertinent past medical history.  Social History     Socioeconomic History   • Marital status:      Spouse name: Not on file   • Number of children: Not on file   • Years of education: Not on file   • Highest education level: Not on file   Occupational History   • Not on file   Social Needs   • Financial resource strain: Not on file   • Food insecurity:     Worry: Not on file     Inability: Not on file   • Transportation needs:     Medical: Not on file     Non-medical: Not on file   Tobacco Use   • Smoking status: Former Smoker     Packs/day: 0.25     Years: 2.00     Pack years: 0.50     Types: Cigarettes     Last attempt to quit: 2002     Years since quittin.0   • Smokeless tobacco: Never Used   Substance and Sexual Activity   • Alcohol use: Yes     Alcohol/week:  "8.4 oz     Types: 14 Standard drinks or equivalent per week   • Drug use: No   • Sexual activity: Yes     Partners: Female   Lifestyle   • Physical activity:     Days per week: Not on file     Minutes per session: Not on file   • Stress: Not on file   Relationships   • Social connections:     Talks on phone: Not on file     Gets together: Not on file     Attends Episcopalian service: Not on file     Active member of club or organization: Not on file     Attends meetings of clubs or organizations: Not on file     Relationship status: Not on file   • Intimate partner violence:     Fear of current or ex partner: Not on file     Emotionally abused: Not on file     Physically abused: Not on file     Forced sexual activity: Not on file   Other Topics Concern   • Not on file   Social History Narrative   • Not on file     Current Outpatient Medications   Medication Sig Dispense Refill   • vitamin D, Ergocalciferol, (DRISDOL) 57849 units Cap capsule Take 1 Cap by mouth every 7 days. 4 Cap 3   • polyethylene glycol/lytes (MIRALAX) Pack Take 1 Packet by mouth every day. Please take to prevent constipation following your procedure.  If having loose stools this can be held. (Patient not taking: Reported on 11/25/2019) 30 Each 0     No current facility-administered medications for this visit.      Family History   Problem Relation Age of Onset   • Diabetes Mother    • No Known Problems Father          Review of Systems   All other systems reviewed and are negative.         Objective:     /68 (BP Location: Left arm, Patient Position: Sitting)   Pulse 67   Temp 36.8 °C (98.3 °F)   Ht 1.905 m (6' 3\")   Wt 112.9 kg (249 lb)   SpO2 98%   BMI 31.12 kg/m²      Physical Exam  Vitals signs and nursing note reviewed.   Constitutional:       General: He is not in acute distress.     Appearance: He is well-developed. He is not diaphoretic.   HENT:      Head: Normocephalic and atraumatic.      Right Ear: External ear normal.      " Left Ear: External ear normal.      Nose: Nose normal.   Eyes:      General:         Right eye: No discharge.         Left eye: No discharge.      Conjunctiva/sclera: Conjunctivae normal.   Neck:      Musculoskeletal: Normal range of motion and neck supple.      Thyroid: No thyromegaly.      Trachea: No tracheal deviation.   Cardiovascular:      Rate and Rhythm: Normal rate and regular rhythm.      Heart sounds: Normal heart sounds. No murmur.   Pulmonary:      Effort: Pulmonary effort is normal. No respiratory distress.      Breath sounds: Normal breath sounds. No wheezing or rales.   Abdominal:      General: Bowel sounds are normal. There is no distension.      Palpations: Abdomen is soft. There is no mass.      Tenderness: There is no tenderness. There is no guarding or rebound.      Hernia: No hernia is present.   Lymphadenopathy:      Cervical: No cervical adenopathy.   Skin:     General: Skin is warm and dry.      Findings: No erythema or rash.   Neurological:      Mental Status: He is alert and oriented to person, place, and time.      Coordination: Coordination normal.   Psychiatric:         Behavior: Behavior normal.         Thought Content: Thought content normal.         Judgment: Judgment normal.                 Assessment/Plan:       1. Routine general medical examination at a health care facility  Patient advised to decrease his alcohol consumption, lose weight and exercise more.    2. Blood glucose elevated  Patient again shows elevated blood sugars but good hemoglobin A1c of 5.1.  I advised him he may be having too many carbs at night before his testing since he tends to drink his alcohol at night and have a high carb snack.  I advised him on the risk for diabetes.  I explained this also may be why his triglycerides have been elevated  - POCT  A1C    3. Vitamin D deficiency  I will start patient on vitamin D 50,000 units daily for 4 months.    4. Need for influenza vaccination  I have placed the  below orders and discussed them with an approved delegating provider. The MA is performing the below orders under the direction of Dr. Ohara    - Influenza Vaccine Quad Injection (PF)    5. Obesity (BMI 30-39.9)  Patient advised to lose weight and exercise  - Patient identified as having weight management issue.  Appropriate orders and counseling given.

## 2019-12-11 ENCOUNTER — OFFICE VISIT (OUTPATIENT)
Dept: MEDICAL GROUP | Facility: MEDICAL CENTER | Age: 49
End: 2019-12-11
Payer: COMMERCIAL

## 2019-12-11 VITALS
HEART RATE: 68 BPM | RESPIRATION RATE: 16 BRPM | WEIGHT: 250 LBS | BODY MASS INDEX: 31.08 KG/M2 | SYSTOLIC BLOOD PRESSURE: 130 MMHG | HEIGHT: 75 IN | OXYGEN SATURATION: 97 % | DIASTOLIC BLOOD PRESSURE: 70 MMHG

## 2019-12-11 DIAGNOSIS — H10.13 ALLERGIC CONJUNCTIVITIS OF BOTH EYES: ICD-10-CM

## 2019-12-11 PROCEDURE — 99213 OFFICE O/P EST LOW 20 MIN: CPT | Performed by: NURSE PRACTITIONER

## 2019-12-11 RX ORDER — OLOPATADINE HYDROCHLORIDE 1 MG/ML
1 SOLUTION/ DROPS OPHTHALMIC 2 TIMES DAILY
Qty: 5 ML | Refills: 11 | Status: SHIPPED | OUTPATIENT
Start: 2019-12-11

## 2019-12-11 ASSESSMENT — ENCOUNTER SYMPTOMS
EYE REDNESS: 1
EYE DISCHARGE: 1

## 2019-12-11 NOTE — PROGRESS NOTES
Subjective:      Oskar Bean is a 49 y.o. male who presents with Conjunctivitis (both eyes, ongoing issue )        CC: Patient here today accompanied by his wife for recurrent bilateral eye irritation.    HPI       1. Allergic conjunctivitis of both eyes  Patient states for at least 1 or 2 years he has been having on and off problems with his eyes.  He states about once a month he will develop redness in either 1 of his eyes or both of his eyes with pruritus and sometimes periorbital swelling.  He does not know what triggers this.  His current symptoms started about 3 days ago with his right eye and then spread to his left eye.  It is actually improved today and he states there is no further swelling but there is still some redness.  Symptoms typically go away within 4 days.  He typically treats with over-the-counter eye lubricant but no other drops.  He states he has not been to an optometrist in a long time.  He denies eye pain or vision loss.  He states sometimes an antihistamine pill helps.  His recent lab work came back with a normal CBC with slight elevation in eosinophils even before symptoms started.  History reviewed. No pertinent past medical history.  Social History     Socioeconomic History   • Marital status:      Spouse name: Not on file   • Number of children: Not on file   • Years of education: Not on file   • Highest education level: Not on file   Occupational History   • Not on file   Social Needs   • Financial resource strain: Not on file   • Food insecurity:     Worry: Not on file     Inability: Not on file   • Transportation needs:     Medical: Not on file     Non-medical: Not on file   Tobacco Use   • Smoking status: Former Smoker     Packs/day: 0.25     Years: 2.00     Pack years: 0.50     Types: Cigarettes     Last attempt to quit: 2002     Years since quittin.1   • Smokeless tobacco: Never Used   Substance and Sexual Activity   • Alcohol use: Yes     Alcohol/week:  "8.4 oz     Types: 14 Standard drinks or equivalent per week   • Drug use: No   • Sexual activity: Yes     Partners: Female   Lifestyle   • Physical activity:     Days per week: Not on file     Minutes per session: Not on file   • Stress: Not on file   Relationships   • Social connections:     Talks on phone: Not on file     Gets together: Not on file     Attends Orthodox service: Not on file     Active member of club or organization: Not on file     Attends meetings of clubs or organizations: Not on file     Relationship status: Not on file   • Intimate partner violence:     Fear of current or ex partner: Not on file     Emotionally abused: Not on file     Physically abused: Not on file     Forced sexual activity: Not on file   Other Topics Concern   • Not on file   Social History Narrative   • Not on file     Current Outpatient Medications   Medication Sig Dispense Refill   • olopatadine (PATANOL) 0.1 % ophthalmic solution Place 1 Drop in both eyes 2 times a day. 5 mL 11   • vitamin D, Ergocalciferol, (DRISDOL) 87381 units Cap capsule Take 1 Cap by mouth every 7 days. 4 Cap 3   • polyethylene glycol/lytes (MIRALAX) Pack Take 1 Packet by mouth every day. Please take to prevent constipation following your procedure.  If having loose stools this can be held. (Patient not taking: Reported on 11/25/2019) 30 Each 0     No current facility-administered medications for this visit.      Family History   Problem Relation Age of Onset   • Diabetes Mother    • No Known Problems Father          Review of Systems   Eyes: Positive for discharge and redness.   All other systems reviewed and are negative.         Objective:     /70 (BP Location: Left arm, Patient Position: Sitting, BP Cuff Size: Adult)   Pulse 68   Resp 16   Ht 1.905 m (6' 3\")   Wt 113.4 kg (250 lb)   SpO2 97%   BMI 31.25 kg/m²      Physical Exam  Vitals signs and nursing note reviewed.   Constitutional:       General: He is not in acute distress.     " Appearance: He is well-developed. He is not diaphoretic.   HENT:      Head: Normocephalic and atraumatic.      Right Ear: External ear normal.      Left Ear: External ear normal.      Nose: Nose normal.   Eyes:      General:         Right eye: No discharge.         Left eye: No discharge.      Extraocular Movements: Extraocular movements intact.      Conjunctiva/sclera:      Right eye: Right conjunctiva is injected. No hemorrhage.     Left eye: Left conjunctiva is injected. No hemorrhage.  Neck:      Musculoskeletal: Normal range of motion and neck supple.      Thyroid: No thyromegaly.      Trachea: No tracheal deviation.   Cardiovascular:      Rate and Rhythm: Normal rate and regular rhythm.      Heart sounds: Normal heart sounds. No murmur.   Pulmonary:      Effort: Pulmonary effort is normal. No respiratory distress.      Breath sounds: Normal breath sounds. No wheezing or rales.   Lymphadenopathy:      Cervical: No cervical adenopathy.   Skin:     General: Skin is warm and dry.      Findings: No erythema or rash.   Neurological:      Mental Status: He is alert and oriented to person, place, and time.      Coordination: Coordination normal.   Psychiatric:         Behavior: Behavior normal.         Thought Content: Thought content normal.         Judgment: Judgment normal.                 Assessment/Plan:       1. Allergic conjunctivitis of both eyes  With recurring symptoms that typically improve on their own, I suspect an allergic cause for this.  He has not seen an optometrist in decades and I explained he should see 1 at least every 2 years.  I advised him to make an appointment with local optometry for follow-up.  I advised him to have pressure testing for glaucoma.  I will start him on Patanol unless it is too expensive and then I advised him to talk with the pharmacist about something over-the-counter which might be similar to use.  He will try to avoid irritants to the eye or rub the eyes.  He can try cool  compresses to the area.  He will go to the ER if he develops vision loss or pain  - olopatadine (PATANOL) 0.1 % ophthalmic solution; Place 1 Drop in both eyes 2 times a day.  Dispense: 5 mL; Refill: 11

## 2020-10-12 DIAGNOSIS — E55.9 VITAMIN D DEFICIENCY: ICD-10-CM

## 2020-10-12 DIAGNOSIS — Z00.00 ROUTINE GENERAL MEDICAL EXAMINATION AT A HEALTH CARE FACILITY: ICD-10-CM

## 2020-11-12 ENCOUNTER — HOSPITAL ENCOUNTER (OUTPATIENT)
Dept: LAB | Facility: MEDICAL CENTER | Age: 50
End: 2020-11-12
Attending: NURSE PRACTITIONER
Payer: COMMERCIAL

## 2020-11-12 DIAGNOSIS — Z00.00 ROUTINE GENERAL MEDICAL EXAMINATION AT A HEALTH CARE FACILITY: ICD-10-CM

## 2020-11-12 DIAGNOSIS — E55.9 VITAMIN D DEFICIENCY: ICD-10-CM

## 2020-11-12 LAB
25(OH)D3 SERPL-MCNC: 18 NG/ML (ref 30–100)
ALBUMIN SERPL BCP-MCNC: 4.4 G/DL (ref 3.2–4.9)
ALBUMIN/GLOB SERPL: 1.7 G/DL
ALP SERPL-CCNC: 61 U/L (ref 30–99)
ALT SERPL-CCNC: 22 U/L (ref 2–50)
ANION GAP SERPL CALC-SCNC: 8 MMOL/L (ref 7–16)
AST SERPL-CCNC: 15 U/L (ref 12–45)
BILIRUB SERPL-MCNC: 0.7 MG/DL (ref 0.1–1.5)
BUN SERPL-MCNC: 17 MG/DL (ref 8–22)
CALCIUM SERPL-MCNC: 9.7 MG/DL (ref 8.5–10.5)
CHLORIDE SERPL-SCNC: 101 MMOL/L (ref 96–112)
CHOLEST SERPL-MCNC: 221 MG/DL (ref 100–199)
CO2 SERPL-SCNC: 29 MMOL/L (ref 20–33)
CREAT SERPL-MCNC: 1.1 MG/DL (ref 0.5–1.4)
ERYTHROCYTE [DISTWIDTH] IN BLOOD BY AUTOMATED COUNT: 41.6 FL (ref 35.9–50)
FASTING STATUS PATIENT QL REPORTED: NORMAL
GLOBULIN SER CALC-MCNC: 2.6 G/DL (ref 1.9–3.5)
GLUCOSE SERPL-MCNC: 108 MG/DL (ref 65–99)
HCT VFR BLD AUTO: 49.5 % (ref 42–52)
HDLC SERPL-MCNC: 37 MG/DL
HGB BLD-MCNC: 16.7 G/DL (ref 14–18)
LDLC SERPL CALC-MCNC: 129 MG/DL
MCH RBC QN AUTO: 32.1 PG (ref 27–33)
MCHC RBC AUTO-ENTMCNC: 33.7 G/DL (ref 33.7–35.3)
MCV RBC AUTO: 95 FL (ref 81.4–97.8)
PLATELET # BLD AUTO: 193 K/UL (ref 164–446)
PMV BLD AUTO: 12 FL (ref 9–12.9)
POTASSIUM SERPL-SCNC: 4.9 MMOL/L (ref 3.6–5.5)
PROT SERPL-MCNC: 7 G/DL (ref 6–8.2)
RBC # BLD AUTO: 5.21 M/UL (ref 4.7–6.1)
SODIUM SERPL-SCNC: 138 MMOL/L (ref 135–145)
TRIGL SERPL-MCNC: 273 MG/DL (ref 0–149)
WBC # BLD AUTO: 5.6 K/UL (ref 4.8–10.8)

## 2020-11-12 PROCEDURE — 80061 LIPID PANEL: CPT

## 2020-11-12 PROCEDURE — 80053 COMPREHEN METABOLIC PANEL: CPT

## 2020-11-12 PROCEDURE — 36415 COLL VENOUS BLD VENIPUNCTURE: CPT

## 2020-11-12 PROCEDURE — 82306 VITAMIN D 25 HYDROXY: CPT

## 2020-11-12 PROCEDURE — 85027 COMPLETE CBC AUTOMATED: CPT

## 2020-11-16 ENCOUNTER — OFFICE VISIT (OUTPATIENT)
Dept: MEDICAL GROUP | Facility: MEDICAL CENTER | Age: 50
End: 2020-11-16
Payer: COMMERCIAL

## 2020-11-16 VITALS
TEMPERATURE: 97.6 F | RESPIRATION RATE: 16 BRPM | DIASTOLIC BLOOD PRESSURE: 72 MMHG | SYSTOLIC BLOOD PRESSURE: 132 MMHG | OXYGEN SATURATION: 95 % | HEART RATE: 78 BPM | BODY MASS INDEX: 31.33 KG/M2 | WEIGHT: 252 LBS | HEIGHT: 75 IN

## 2020-11-16 DIAGNOSIS — R73.9 BLOOD GLUCOSE ELEVATED: ICD-10-CM

## 2020-11-16 DIAGNOSIS — Z12.11 SCREEN FOR COLON CANCER: ICD-10-CM

## 2020-11-16 DIAGNOSIS — E55.9 VITAMIN D DEFICIENCY: ICD-10-CM

## 2020-11-16 DIAGNOSIS — E78.5 DYSLIPIDEMIA: ICD-10-CM

## 2020-11-16 DIAGNOSIS — Z00.00 ROUTINE GENERAL MEDICAL EXAMINATION AT A HEALTH CARE FACILITY: ICD-10-CM

## 2020-11-16 DIAGNOSIS — Z23 INFLUENZA VACCINE NEEDED: ICD-10-CM

## 2020-11-16 LAB
HBA1C MFR BLD: 4.9 % (ref 0–5.6)
INT CON NEG: NEGATIVE
INT CON POS: POSITIVE

## 2020-11-16 PROCEDURE — 90471 IMMUNIZATION ADMIN: CPT | Performed by: NURSE PRACTITIONER

## 2020-11-16 PROCEDURE — 90686 IIV4 VACC NO PRSV 0.5 ML IM: CPT | Performed by: NURSE PRACTITIONER

## 2020-11-16 PROCEDURE — 83036 HEMOGLOBIN GLYCOSYLATED A1C: CPT | Performed by: NURSE PRACTITIONER

## 2020-11-16 PROCEDURE — 99396 PREV VISIT EST AGE 40-64: CPT | Mod: 25 | Performed by: NURSE PRACTITIONER

## 2020-11-16 RX ORDER — ERGOCALCIFEROL 1.25 MG/1
50000 CAPSULE ORAL
Qty: 4 CAP | Refills: 2 | Status: SHIPPED | OUTPATIENT
Start: 2020-11-16

## 2020-11-16 ASSESSMENT — FIBROSIS 4 INDEX: FIB4 SCORE: 0.83

## 2020-11-16 ASSESSMENT — PATIENT HEALTH QUESTIONNAIRE - PHQ9: CLINICAL INTERPRETATION OF PHQ2 SCORE: 0

## 2020-11-17 NOTE — PROGRESS NOTES
Subjective:      Oskar Bean is a 50 y.o. male who presents with Annual Exam        CC: Patient is here today accompanied by his wife for yearly checkup as well as follow-up on vitamin D deficiency and dyslipidemia.    HPI       1. Routine general medical examination at a health care facility  Oskar states today that he feels very healthy and exercises regularly.  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  He has no physical or mental health complaints today.    2. Vitamin D deficiency  Patient has history of vitamin D deficiency and was taking weekly vitamin D but after he finished his 3 months he stopped it and subsequently his vitamin D levels are low again at 18.    3. Dyslipidemia  Patient has history of elevated LDL, total cholesterol triglycerides but his previous 10-year cardiac risk assessment was less than 5%.  He shows today decreased HDL at 37 and LDL of 129 and although his 10-year cardiac risk assessment score is higher, it is still within acceptable range at 5.6%.    4. Blood glucose elevated  Recent blood sugar testing came back at 108 but his hemoglobin A1c is well within normal range at 4.9    5. Influenza vaccine needed  Patient due for vaccine    6. Screen for colon cancer  Patient due for first colonoscopy  History reviewed. No pertinent past medical history.  Social History     Socioeconomic History   • Marital status:      Spouse name: Not on file   • Number of children: Not on file   • Years of education: Not on file   • Highest education level: Not on file   Occupational History   • Not on file   Social Needs   • Financial resource strain: Not on file   • Food insecurity     Worry: Not on file     Inability: Not on file   • Transportation needs     Medical: Not on file     Non-medical: Not on file   Tobacco Use   • Smoking status: Former Smoker     Packs/day: 0.25     Years: 2.00     Pack years: 0.50     Types: Cigarettes     Quit date:  "2002     Years since quittin.0   • Smokeless tobacco: Never Used   Substance and Sexual Activity   • Alcohol use: Yes     Alcohol/week: 8.4 oz     Types: 14 Standard drinks or equivalent per week   • Drug use: No   • Sexual activity: Yes     Partners: Female   Lifestyle   • Physical activity     Days per week: Not on file     Minutes per session: Not on file   • Stress: Not on file   Relationships   • Social connections     Talks on phone: Not on file     Gets together: Not on file     Attends Synagogue service: Not on file     Active member of club or organization: Not on file     Attends meetings of clubs or organizations: Not on file     Relationship status: Not on file   • Intimate partner violence     Fear of current or ex partner: Not on file     Emotionally abused: Not on file     Physically abused: Not on file     Forced sexual activity: Not on file   Other Topics Concern   • Not on file   Social History Narrative   • Not on file     Current Outpatient Medications   Medication Sig Dispense Refill   • vitamin D, Ergocalciferol, (DRISDOL) 1.25 MG (54376 UT) Cap capsule Take 1 Cap by mouth every 7 days. 4 Cap 2   • olopatadine (PATANOL) 0.1 % ophthalmic solution Place 1 Drop in both eyes 2 times a day. 5 mL 11     No current facility-administered medications for this visit.      Family History   Problem Relation Age of Onset   • Diabetes Mother    • No Known Problems Father          Review of Systems   All other systems reviewed and are negative.         Objective:     /72 (BP Location: Left arm, Patient Position: Sitting, BP Cuff Size: Adult)   Pulse 78   Temp 36.4 °C (97.6 °F) (Temporal)   Resp 16   Ht 1.905 m (6' 3\")   Wt 114.3 kg (252 lb)   SpO2 95%   BMI 31.50 kg/m²      Physical Exam  Vitals signs and nursing note reviewed.   Constitutional:       General: He is not in acute distress.     Appearance: He is well-developed. He is not diaphoretic.   HENT:      Head: Normocephalic and " atraumatic.      Right Ear: External ear normal.      Left Ear: External ear normal.      Nose: Nose normal.   Eyes:      General:         Right eye: No discharge.         Left eye: No discharge.      Conjunctiva/sclera: Conjunctivae normal.   Neck:      Musculoskeletal: Normal range of motion and neck supple.      Thyroid: No thyromegaly.      Trachea: No tracheal deviation.   Cardiovascular:      Rate and Rhythm: Normal rate and regular rhythm.      Heart sounds: Normal heart sounds. No murmur.   Pulmonary:      Effort: Pulmonary effort is normal. No respiratory distress.      Breath sounds: Normal breath sounds. No wheezing or rales.   Abdominal:      General: Bowel sounds are normal. There is no distension.      Palpations: Abdomen is soft. There is no mass.      Tenderness: There is no abdominal tenderness. There is no guarding or rebound.      Hernia: No hernia is present.   Lymphadenopathy:      Cervical: No cervical adenopathy.   Skin:     General: Skin is warm and dry.      Findings: No erythema or rash.   Neurological:      Mental Status: He is alert and oriented to person, place, and time.      Coordination: Coordination normal.   Psychiatric:         Behavior: Behavior normal.         Thought Content: Thought content normal.         Judgment: Judgment normal.                 Assessment/Plan:        1. Routine general medical examination at a health care facility  I reviewed with patient all of his lab work which looks fairly good and he was given lab work to do before his next visit a year from now.  I recommended a good diet and regular exercise.  - Comp Metabolic Panel; Future  - Lipid Profile; Future  - PROSTATE SPECIFIC AG SCREENING; Future  - VITAMIN D,25 HYDROXY; Future  - CBC WITHOUT DIFFERENTIAL; Future    2. Vitamin D deficiency  Patient to start on weekly vitamin D for 3 months and then will switch to over-the-counter vitamin D at 2000 units daily.  - vitamin D, Ergocalciferol, (DRISDOL) 1.25  MG (00185 UT) Cap capsule; Take 1 Cap by mouth every 7 days.  Dispense: 4 Cap; Refill: 2  - VITAMIN D,25 HYDROXY; Future    3. Dyslipidemia  Patient will try to exercise more to bring up his HDL and eat better to improve LDL and triglycerides.  His 10-year cardiac risk assessment score is only at 5.6%    4. Blood glucose elevated  Although blood sugar mildly elevated, his hemoglobin A1c was only 4.9%  - POCT  A1C    5. Influenza vaccine needed  I have placed the below orders and discussed them with an approved delegating provider. The MA is performing the below orders under the direction of Dr. Yesy MD.    - Influenza Vaccine Quad Injection (PF)    6. Screen for colon cancer  Patient will be due for for screening  - REFERRAL TO GASTROENTEROLOGY

## 2020-12-09 DIAGNOSIS — Z12.11 SCREEN FOR COLON CANCER: ICD-10-CM

## 2021-02-25 ENCOUNTER — TELEPHONE (OUTPATIENT)
Dept: OTHER | Facility: REHABILITATION | Age: 51
End: 2021-02-25

## 2021-02-25 NOTE — TELEPHONE ENCOUNTER
Research Contact Preference Update    Date: 02/25/21   Patient Contact Method: E-Mail  Research Contact Preference: Do Not Contact    Updated by: Wood Ram

## 2021-04-28 ENCOUNTER — IMMUNIZATION (OUTPATIENT)
Dept: FAMILY PLANNING/WOMEN'S HEALTH CLINIC | Facility: IMMUNIZATION CENTER | Age: 51
End: 2021-04-28
Payer: COMMERCIAL

## 2021-04-28 DIAGNOSIS — Z23 ENCOUNTER FOR VACCINATION: Primary | ICD-10-CM

## 2021-04-28 PROCEDURE — 91300 PFIZER SARS-COV-2 VACCINE: CPT | Performed by: INTERNAL MEDICINE

## 2021-04-28 PROCEDURE — 0001A PFIZER SARS-COV-2 VACCINE: CPT | Performed by: INTERNAL MEDICINE

## 2021-05-21 ENCOUNTER — IMMUNIZATION (OUTPATIENT)
Dept: FAMILY PLANNING/WOMEN'S HEALTH CLINIC | Facility: IMMUNIZATION CENTER | Age: 51
End: 2021-05-21
Payer: COMMERCIAL

## 2021-05-21 DIAGNOSIS — Z23 ENCOUNTER FOR VACCINATION: Primary | ICD-10-CM

## 2021-05-21 PROCEDURE — 0002A PFIZER SARS-COV-2 VACCINE: CPT

## 2021-05-21 PROCEDURE — 91300 PFIZER SARS-COV-2 VACCINE: CPT

## 2024-07-26 ENCOUNTER — APPOINTMENT (OUTPATIENT)
Dept: MEDICAL GROUP | Facility: PHYSICIAN GROUP | Age: 54
End: 2024-07-26
Payer: COMMERCIAL

## (undated) DEVICE — CLIP SM INTNL HRZN TI ESCP LGT - (24EA/PK 25PK/BX)

## (undated) DEVICE — SUTURE 3-0 ETHILON FS-1 - (36/BX) 30 INCH

## (undated) DEVICE — SUTURE 3-0 CHROMIC GUT SH 27 (36PK/BX)"

## (undated) DEVICE — BLADE SURGICAL CLIPPER - (50EA/CA)

## (undated) DEVICE — ELECTRODE DUAL RETURN W/ CORD - (50/PK)

## (undated) DEVICE — GLOVE BIOGEL INDICATOR SZ 8 SURGICAL PF LTX - (50/BX 4BX/CA)

## (undated) DEVICE — PROTECTOR ULNA NERVE - (36PR/CA)

## (undated) DEVICE — MASK, LARYNGEAL AIRWAY #5

## (undated) DEVICE — DRAIN PENROSE 3/8 IN X 12 IN - STERILE (25EA/BX)

## (undated) DEVICE — COVER LIGHT HANDLE FLEXIBLE - SOFT (2EA/PK 80PK/CA)

## (undated) DEVICE — ADHESIVE DERMABOND HVD MINI (12EA/BX)

## (undated) DEVICE — TOWELS CLOTH SURGICAL - (4/PK 20PK/CA)

## (undated) DEVICE — ELECTRODE 850 FOAM ADHESIVE - HYDROGEL RADIOTRNSPRNT (50/PK)

## (undated) DEVICE — SPONGE PEANUT - (5/PK 50PK/CA)

## (undated) DEVICE — DRAPE LARGE 3 QUARTER - (20/CA)

## (undated) DEVICE — GLOVE BIOGEL INDICATOR SZ 7.5 SURGICAL PF LTX - (50PR/BX 4BX/CA)

## (undated) DEVICE — TRAY SRGPRP PVP IOD WT PRP - (20/CA)

## (undated) DEVICE — SENSOR SPO2 NEO LNCS ADHESIVE (20/BX) SEE USER NOTES

## (undated) DEVICE — BLADE SURGICAL #15 - (50/BX 3BX/CA)

## (undated) DEVICE — GAUZE FLUFF STERILE 2-PLY 36 X 36 (100EA/CA)

## (undated) DEVICE — DRAPE LAPAROTOMY T SHEET - (12EA/CA)

## (undated) DEVICE — GLOVE BIOGEL PI ULTRATOUCH SZ 7.5 SURGICAL PF LF -(50/BX 4BX/CA)

## (undated) DEVICE — PACK MINOR BASIN - (2EA/CA)

## (undated) DEVICE — DRAPE MAGNETIC (INSTRA-MAG) - (30/CA)

## (undated) DEVICE — SUTURE 2-0 VICRYL PLUS SH - 27 INCH (36/BX)

## (undated) DEVICE — MASK ANESTHESIA ADULT  - (100/CA)

## (undated) DEVICE — SUTURE GENERAL

## (undated) DEVICE — HEAD HOLDER JUNIOR/ADULT

## (undated) DEVICE — HEMOSTAT SURG ABSORBABLE - 4 X 8 IN SURGICEL (24EA/CA)

## (undated) DEVICE — SUTURE 3-0 VICRYL PLUS SH - 27 INCH (36/BX)